# Patient Record
(demographics unavailable — no encounter records)

---

## 2025-02-27 NOTE — PROCEDURE
[Aspiration] : Aspiration [Injection] : Injection [Left] : of the left [Knee Joint] : knee joint [Effusion] : Effusion [Osteoarthritis] : Osteoarthritis [Inflammation] : Inflammation [Patient] : patient [Verbal Consent Obtained] : verbal consent was obtained prior to the procedure [Alcohol] : Alcohol [Betadine] : Betadine [Ethyl Chloride Spray] : ethyl chloride spray was used as a topical anesthetic [Lateral] : lateral [Superior] : superior [18] : an 18-gauge [___ mL Fluid] : [unfilled] mL of [Yellow] : yellow [Clear] : clear [Same Needle/New Syringe] : the syringe was changed and the same needle was left in place and [1% Lidocaine___(mL)] : [unfilled] mL of 1% Lidocaine [0.25% Bupivacaine___(mL)] : [unfilled] mL of 0.25% Bupivacaine [Triamcinolone 40mg/mL___(mL)] : [unfilled] ~UmL of 40mg/mL triamcinolone [Bandage Applied] : a bandage [Tolerated Well] : The patient tolerated the procedure well [None] : none [PRN] : as needed

## 2025-02-27 NOTE — PHYSICAL EXAM
[de-identified] : Appointed physical exam today shows a 1-2+ effusion associated with the left knee has full extension flexion is limited about 110 which seems to be secondary to tightness based on his discussion and how he is describing symptoms with his son.  There is no instability in varus valgus testing alignment is normal there is no evidence for fracture.  He does weight-bear on the leg but uses a cane in the opposite hand appropriately and does well with ambulation.  Cane is a 4-prong cane.  No significant evidence for problems associated with his hip no hip flexion contracture and his foot appears normal with walking.  Additional focused exam for meniscus tear is negative for significant pain and mechanical symptoms he is also minimally tender along the joint medially. [de-identified] : 4 view standing radiographs of the affected left knee demonstrate grade 2 narrowing of the medial compartment of both knees with some potential very early osteophyte formation seen best on the notch view and the notch on the medial femoral condylar conversion into the notch.  Slight peaking of the tibial eminences however very minimal.  Again joint spaces about 50% narrowed if not slightly more.  Patellas are nicely seated in the trochlear groove without evident without evidence for significant peripheral osteophyte formation.

## 2025-02-27 NOTE — ASSESSMENT
[FreeTextEntry1] : Impression: Primarily osteoarthritic symptoms associated with his left knee along with evidence for meniscus pathology based on MRI findings.  Recommended an aspiration injection today based on his effusion and they agreed and his result was excellent.  He noticed immediate some immediate improvement on standing up from the exam table was able to sit and stand with much less discomfort felt like his range of motion was improved and in fact walked to the front of the office without really using his cane.  They were very happy with the visit today I did give them Dr. Nicole and Dr. Dennison's cards for potential spinal intervention as he does have some L4-5 stenosis though currently this does not seem to be the issue with him and only really complains about the knee.  Consideration in the future if the shot is short-lived and its effect and is effective this would consider certainly not an option for viscosupplementation however uncertain as to whether or not this would make sense based on insurance status.  1 other option though he is on Xarelto would be to consider low-dose intermittent use of 100 mg of Celebrex once or twice a day in the event that he has significant pain.  Again the Mitchell 1 effect of Celebrex being so minimal typically does not cause any significant platelet inhibition and this has been studied extensively and therefore really would not add to his anticoagulated state significantly and I believe would be safe for him to consider for short-term use during bad episodes.  At this point we will hold off on that as hopefully the shot would beneficial

## 2025-02-27 NOTE — HISTORY OF PRESENT ILLNESS
[de-identified] : Mandeep is an 85-year-old male who is here today for the evaluation of his left knee.  He is sent with outside printed results from MRI scan of both the cervical and lumbar spine but as well as his left knee.  Left knee results demonstrated arthritic cartilage articular cartilage loss associated with the medial and patellofemoral compartments as well as a significant medial meniscal tear.  He has significant tightness and swelling in the joint and has pain that bothers him most of the time occasionally can keep awake at night and limit his activities.  He is here with his son who is assisting in translation though admittedly the son's English is somewhat poor he has demonstrated to me that he feels satisfied with our communication level today does not require a .  Referring physician who is a chiropractor suggested injection and I would agree today that that is an appropriate first intervention for this patient based on his initial evaluation and then subsequently from his physical exam.